# Patient Record
Sex: MALE
[De-identification: names, ages, dates, MRNs, and addresses within clinical notes are randomized per-mention and may not be internally consistent; named-entity substitution may affect disease eponyms.]

---

## 2019-08-03 ENCOUNTER — HOSPITAL ENCOUNTER (EMERGENCY)
Dept: HOSPITAL 97 - ER | Age: 29
Discharge: HOME | End: 2019-08-03
Payer: COMMERCIAL

## 2019-08-03 DIAGNOSIS — E86.0: Primary | ICD-10-CM

## 2019-08-03 LAB
ALBUMIN SERPL BCP-MCNC: 3.8 G/DL (ref 3.4–5)
ALP SERPL-CCNC: 69 U/L (ref 45–117)
ALT SERPL W P-5'-P-CCNC: 54 U/L (ref 12–78)
AST SERPL W P-5'-P-CCNC: 34 U/L (ref 15–37)
BUN BLD-MCNC: 21 MG/DL (ref 7–18)
CKMB CREATINE KINASE MB: 1.9 NG/ML (ref 0.3–3.6)
GLUCOSE SERPLBLD-MCNC: 112 MG/DL (ref 74–106)
HCT VFR BLD CALC: 49.5 % (ref 39.6–49)
INR BLD: 0.97
LYMPHOCYTES # SPEC AUTO: 2.1 K/UL (ref 0.7–4.9)
MAGNESIUM SERPL-MCNC: 2.2 MG/DL (ref 1.8–2.4)
PMV BLD: 8.7 FL (ref 7.6–11.3)
POTASSIUM SERPL-SCNC: 4.3 MMOL/L (ref 3.5–5.1)
RBC # BLD: 5.47 M/UL (ref 4.33–5.43)
TROPONIN (EMERG DEPT USE ONLY): < 0.02 NG/ML (ref 0–0.04)

## 2019-08-03 PROCEDURE — 82553 CREATINE MB FRACTION: CPT

## 2019-08-03 PROCEDURE — 85610 PROTHROMBIN TIME: CPT

## 2019-08-03 PROCEDURE — 84484 ASSAY OF TROPONIN QUANT: CPT

## 2019-08-03 PROCEDURE — 36415 COLL VENOUS BLD VENIPUNCTURE: CPT

## 2019-08-03 PROCEDURE — 96360 HYDRATION IV INFUSION INIT: CPT

## 2019-08-03 PROCEDURE — 82550 ASSAY OF CK (CPK): CPT

## 2019-08-03 PROCEDURE — 80048 BASIC METABOLIC PNL TOTAL CA: CPT

## 2019-08-03 PROCEDURE — 99284 EMERGENCY DEPT VISIT MOD MDM: CPT

## 2019-08-03 PROCEDURE — 83735 ASSAY OF MAGNESIUM: CPT

## 2019-08-03 PROCEDURE — 85025 COMPLETE CBC W/AUTO DIFF WBC: CPT

## 2019-08-03 PROCEDURE — 80076 HEPATIC FUNCTION PANEL: CPT

## 2019-08-03 PROCEDURE — 93005 ELECTROCARDIOGRAM TRACING: CPT

## 2019-08-03 NOTE — ER
Nurse's Notes                                                                                     

 [facility]                                                                 

Name: Josafat Gutierres                                                                           

Age: 28 yrs                                                                                       

Sex: Male                                                                                         

: 1990                                                                                   

MRN: [id number]                                                                                   

Arrival Date: 2019                                                                          

Time: 11:01                                                                                       

Account#: [id number]                                                                            

Bed 20                                                                                            

Private MD:                                                                                       

Diagnosis: Dizziness and giddiness;Dehydration                                                    

                                                                                                  

Presentation:                                                                                     

                                                                                             

11:01 Presenting complaint: Patient states: i started feeling dizzy Wednesday while at work,  hj  

      it comes and goes but today its constant; reports N/V;. Transition of care: patient was     

      not received from another setting of care. Onset of symptoms was 2019. Risk      

      Assessment: Do you want to hurt yourself or someone else? Patient reports no desire to      

      harm self or others. Initial Sepsis Screen: Does the patient meet any 2 criteria? No.       

      Patient's initial sepsis screen is negative. Does the patient have a suspected source       

      of infection? No. Patient's initial sepsis screen is negative. Care prior to arrival:       

      None.                                                                                       

11:01 Method Of Arrival: Ambulatory                                                             

11:01 Acuity: FLOR 3                                                                           hj  

                                                                                                  

Historical:                                                                                       

- Allergies:                                                                                      

11:03 No Known Allergies;                                                                     hj  

- PMHx:                                                                                           

11:03 None;                                                                                   hj  

- PSHx:                                                                                           

11:03 Knee surgery;                                                                           hj  

                                                                                                  

                                                                                                  

                                                                                                  

Screenin:15 Abuse screen: Denies threats or abuse. Nutritional screening: No deficits noted.        em  

      Tuberculosis screening: No symptoms or risk factors identified. Fall Risk None              

      identified.                                                                                 

                                                                                                  

Assessment:                                                                                       

11:15 General: Appears in no apparent distress. comfortable, Behavior is calm, cooperative,   em  

      Denies fever. Pain: Denies pain. Neuro: Level of Consciousness is awake, alert, obeys       

      commands, Oriented to person, place, time, situation, Reports dizziness that comes and      

      goes, denies being dizzy currently . Denies weakness dizziness, headache.                   

      Cardiovascular: Denies chest pain, Capillary refill < 3 seconds Patient's skin is warm      

      and dry. Rhythm is sinus rhythm. Respiratory: Airway is patent Respiratory effort is        

      even, unlabored, Respiratory pattern is regular, symmetrical. GI: Patient currently         

      denies nausea, vomiting. Derm: Skin is intact, is healthy with good turgor, Skin is         

      pink, warm \T\ dry. Musculoskeletal: Capillary refill < 3 seconds, Range of motion:         

      intact in all extremities.                                                                  

12:30 Reassessment: Patient appears in no apparent distress at this time. Patient and/or      em  

      family updated on plan of care and expected duration. Pain level reassessed. Patient is     

      alert, oriented x 3, equal unlabored respirations, skin warm/dry/pink.                      

                                                                                                  

Vital Signs:                                                                                      

11:03  / 70; Pulse 72; Resp 18; Temp 97.9(TE); Pulse Ox 100% on R/A; Weight 127.01 kg;  hj  

      Height 5 ft. 9 in. (175.26 cm); Pain 0/10;                                                  

11:38  / 87 Standing; Pulse 77;                                                         em  

11:38  / 83 Sitting; Pulse 69;                                                          em  

11:38  / 79 Supine; Pulse 67;                                                           em  

12:36  / 81; Pulse 66; Resp 18; Pulse Ox 100% on R/A;                                   em  

11:03 Body Mass Index 41.35 (127.01 kg, 175.26 cm)                                            hj  

11:38 denies dizzines                                                                         em  

11:38 denies dizziness                                                                        em  

11:38 denies dizziness                                                                        em  

                                                                                                  

ED Course:                                                                                        

11:01 Patient arrived in ED.                                                                  mr  

11:03 Triage completed.                                                                       hj  

11:03 Arm band placed on right wrist.                                                         hj  

11:15 Patient has correct armband on for positive identification. Placed in gown. Bed in low  em  

      position. Call light in reach.                                                              

11:16 Mcbride, Rohith, LVN is Primary Nurse.                                                     em  

11:16 Blake Jhaveri PA is PHCP.                                                                cp  

11:17 Michael Cole MD is Attending Physician.                                                cp  

11:50 Initial lab(s) drawn, by me, sent to lab. Inserted saline lock: 20 gauge in right       em  

      antecubital area, using aseptic technique. Blood collected.                                 

13:08 No provider procedures requiring assistance completed. IV discontinued, intact,         em  

      bleeding controlled, No redness/swelling at site. Pressure dressing applied.                

                                                                                                  

Administered Medications:                                                                         

12:01 Drug: NS 0.9% 1000 ml Route: IV; Rate: 1 bolus; Site: right antecubital;                em  

13:14 Follow up: IV Status: Completed infusion; IV Intake: 1000ml                             em  

                                                                                                  

                                                                                                  

Intake:                                                                                           

13:14 IV: 1000ml; Total: 1000ml.                                                              em  

                                                                                                  

Outcome:                                                                                          

12:56 Discharge ordered by MD.                                                                cp  

13:09 Discharged to home ambulatory.                                                          em  

13:09 Condition: good                                                                             

13:09 Discharge instructions given to patient, Instructed on discharge instructions, follow       

      up and referral plans. Demonstrated understanding of instructions, follow-up care.          

13:09 Patient left the ED.                                                                    em  

                                                                                                  

Signatures:                                                                                       

Zoran                                  mr                                                   

Reed, Rohith, LVN                       LVN  em                                                   

Sameer Silverio, IRINEO                      RN   [place] PA PA   cp                                                   

                                                                                                  

Corrections: (The following items were deleted from the chart)                                    

11:05 11:03 Pulse 72bpm; Resp 18bpm; Pulse Ox 100% RA; Temp 97.9F Temporal; 127.01 kg; Height hj  

      5 ft. 9 in.; BMI: 41.3; Pain 0/10; hj                                                       

                                                                                                  

**************************************************************************************************

## 2019-08-03 NOTE — EDPHYS
Physician Documentation                                                                           

 [facility]                                                                 

Name: Josafat Gutierres                                                                           

Age: 28 yrs                                                                                       

Sex: Male                                                                                         

: 1990                                                                                   

MRN: [id number]                                                                                   

Arrival Date: 2019                                                                          

Time: 11:01                                                                                       

Account#: [id number]                                                                            

Bed 20                                                                                            

Private MD:                                                                                       

ED Physician Michael Cole                                                                         

HPI:                                                                                              

                                                                                             

11:35 This 28 yrs old  Male presents to ER via Ambulatory with complaints of          cp  

      Dizziness.                                                                                  

11:35 The patient presents with dizziness, feeling faint, lightheadedness.                    cp  

11:35 Onset: The symptoms/episode began/occurred 3 day(s) ago, intermittent.                  cp  

11:35 Context: occurred at work, occurred while the patient was working, just prior to the    cp  

      episode the patient experienced no apparent symptoms. Associated signs and symptoms:        

      Pertinent negatives: abdominal pain, blurred vision, chest pain, diaphoresis, focal         

      weakness, shortness of breath, syncope. Severity of symptoms: in the emergency              

      department the symptoms have improved moderately. Patient's baseline: Neuro: alert and      

      fully oriented, Motor: no deficits, Ambulation: walks without assistance, Speech:           

      normal.                                                                                     

                                                                                                  

Historical:                                                                                       

- Allergies:                                                                                      

11:03 No Known Allergies;                                                                     hj  

- PMHx:                                                                                           

11:03 None;                                                                                   hj  

- PSHx:                                                                                           

11:03 Knee surgery;                                                                           hj  

                                                                                                  

                                                                                                  

                                                                                                  

ROS:                                                                                              

11:40 Constitutional: Negative for body aches, chills, fever, poor PO intake.                 cp  

11:40 Eyes: Negative for injury, pain, redness, and discharge.                                cp  

11:40 ENT: Negative for drainage from ear(s), ear pain, sore throat, difficulty swallowing,       

      difficulty handling secretions.                                                             

11:40 Cardiovascular: Negative for chest pain, edema, palpitations.                               

11:40 Respiratory: Negative for cough, shortness of breath, wheezing.                             

11:40 Abdomen/GI: Negative for abdominal pain, nausea, vomiting, and diarrhea, constipation,      

      black/tarry stool, rectal bleeding.                                                         

11:40 : Negative for urinary symptoms.                                                          

11:40 Skin: Negative for rash.                                                                    

11:40 Neuro: Positive for dizziness, Negative for altered mental status, headache, syncope,       

      weakness.                                                                                   

11:40 All other systems are negative.                                                             

                                                                                                  

Exam:                                                                                             

11:45 Constitutional: The patient appears in no acute distress, alert, awake,                 cp  

      non-diaphoretic, well developed, well nourished, obese.                                     

11:45 Head/Face:  Normocephalic, atraumatic.                                                  cp  

11:45 Eyes: Periorbital structures: appear normal, Conjunctiva: normal, no exudate, no            

      injection, Lids and lashes: appear normal, bilaterally.                                     

11:45 ENT: External ear(s): are unremarkable, Ear canal(s): are normal, clear, TM's: bulging,     

      is not appreciated, bilaterally, dullness, bilaterally, erythema, is not appreciated,       

      bilaterally, Nose: is normal, Mouth: Lips: moist, Oral mucosa: pink and intact, moist,      

      Posterior pharynx: is normal, airway is patent, no erythema, no exudate.                    

11:45 Chest/axilla: Inspection: normal, Palpation: is normal, no crepitus, no tenderness.         

11:45 Cardiovascular: Rate: normal, Rhythm: regular.                                              

11:45 Respiratory: the patient does not display signs of respiratory distress,  Respirations:     

      normal, no use of accessory muscles, no retractions, no splinting, no tachypnea, no         

      acute changes, is not noted, Breath sounds: are clear throughout, no decreased breath       

      sounds, no stridor, no wheezing.                                                            

11:45 Abdomen/GI: Inspection: abdomen appears normal, Palpation: abdomen is soft and              

      non-tender, in all quadrants.                                                               

11:45 Back: pain, is absent, ROM is normal.                                                       

11:45 Skin: no rash present.                                                                      

11:45 Neuro: Orientation: to person, place \T\ time. Mentation: is normal, Cerebellar function:   

      is grossly normal, Motor: moves all fours, strength is normal, Sensation: is normal,        

      Gait: is steady, at a normal pace, without difficulty.                                      

12:15 ECG was reviewed by the Attending Physician.                                            cp  

                                                                                                  

Vital Signs:                                                                                      

11:03  / 70; Pulse 72; Resp 18; Temp 97.9(TE); Pulse Ox 100% on R/A; Weight 127.01 kg;  hj  

      Height 5 ft. 9 in. (175.26 cm); Pain 0/10;                                                  

11:38  / 87 Standing; Pulse 77;                                                         em  

11:38  / 83 Sitting; Pulse 69;                                                          em  

11:38  / 79 Supine; Pulse 67;                                                           em  

12:36  / 81; Pulse 66; Resp 18; Pulse Ox 100% on R/A;                                   em  

11:03 Body Mass Index 41.35 (127.01 kg, 175.26 cm)                                            hj  

11:38 denies dizzines                                                                         em  

11:38 denies dizziness                                                                        em  

11:38 denies dizziness                                                                        em  

                                                                                                  

MDM:                                                                                              

11:24 Patient medically screened.                                                             cp  

12:55 Data reviewed: vital signs, nurses notes, lab test result(s), EKG, and as a result, I   cp  

      will discharge patient.                                                                     

12:55 Differential diagnosis: cardiac arrhythmia, GI bleed, hypovolemia, idiopathic           cp  

      dizziness, TIA. Counseling: I had a detailed discussion with the patient and/or             

      guardian regarding: the historical points, exam findings, and any diagnostic results        

      supporting the discharge/admit diagnosis, lab results, the need for outpatient follow       

      up, a family practitioner. Response to treatment: the patient's symptoms have markedly      

      improved after treatment.                                                                   

                                                                                                  

                                                                                             

11:37 Order name: Basic Metabolic Panel; Complete Time: 12:25                                 cp  

/                                                                                             

12:25 Interpretation: Normal except: ; GLUC 112; BUN 21.                                cp  

                                                                                             

11:37 Order name: CBC with Diff; Complete Time: 12:14                                         cp  

                                                                                             

12:26 Interpretation: Normal except: RBC 5.47; HCT 49.5; EOSINOPHIL % 7.6; EOSA 0.6.          cp  

                                                                                             

11:37 Order name: LFT's; Complete Time: 12:25                                                 cp  

                                                                                             

11:37 Order name: Magnesium; Complete Time: 12:25                                             cp  

                                                                                             

11:37 Order name: PT-INR; Complete Time: 12:14                                                cp  

                                                                                             

11:37 Order name: Troponin (emerg Dept Use Only); Complete Time: 12:25                        cp  

                                                                                             

11:19 Order name: Orthostatics; Complete Time: 11:32                                          cp  

                                                                                             

11:37 Order name: EKG; Complete Time: 11:38                                                   cp  

                                                                                             

11:37 Order name: EKG - Nurse/Tech; Complete Time: 12:30                                      cp  

                                                                                             

11:37 Order name: Cardiac monitoring; Complete Time: 11:58                                    cp  

                                                                                             

11:37 Order name: IV Saline Lock; Complete Time: 11:58                                        cp  

                                                                                             

11:37 Order name: CK; Complete Time: 12:25                                                    cp  

                                                                                             

12:26 Interpretation: Abnormal: .                                                      cp  

                                                                                             

11:37 Order name: Ckmb; Complete Time: 12:25                                                  cp  

                                                                                             

11:37 Order name: Labs collected and sent; Complete Time: 11:58                               cp  

                                                                                             

11:37 Order name: O2 Per Protocol; Complete Time: 11:58                                       cp  

                                                                                             

11:37 Order name: O2 Sat Monitoring; Complete Time: :58                                     cp  

                                                                                                  

EC:15 Rate is 60 beats/min. Rhythm is regular. OK interval is normal. QRS interval is normal. cp  

      QT interval is normal. Interpreted by me. Reviewed by me.                                   

                                                                                                  

Administered Medications:                                                                         

12:01 Drug: NS 0.9% 1000 ml Route: IV; Rate: 1 bolus; Site: right antecubital;                em  

13:14 Follow up: IV Status: Completed infusion; IV Intake: 1000ml                             em  

                                                                                                  

                                                                                                  

Disposition:                                                                                      

15:27 Co-signature as Attending Physician, Michael Cole MD.                                    rn  

                                                                                                  

Disposition:                                                                                      

19 12:56 Discharged to Home. Impression: Dizziness and giddiness, Dehydration.              

- Condition is Stable.                                                                            

- Discharge Instructions: Dehydration, Adult, Dizziness.                                          

                                                                                                  

- Medication Reconciliation Form, Thank You Letter, Antibiotic Education, Prescription            

  Opioid Use form.                                                                                

- Follow up: Private Physician; When: 2 - 3 days; Reason: Worsening of condition.                 

- Problem is new.                                                                                 

- Symptoms have improved.                                                                         

                                                                                                  

                                                                                                  

                                                                                                  

Signatures:                                                                                       

Dispatcher [employer]                           EDMS                                                 

Rohith Mcbride, LVN                       LVN                                                     

Michael Cole MD MD rn Joaquin, Henry, RN RN hj Page, Corey, PA PA   cp                                                   

                                                                                                  

Corrections: (The following items were deleted from the chart)                                    

12:26 12:15 Normal except: RBC 5.47; HCT 49.5; EOSINOPHIL % 7.6. cp                           cp  

12:56 12:56 2019 12:56 Discharged to Home. Impression: Dizziness and giddiness.         cp  

      Condition is Stable. Forms are Medication Reconciliation Form, Thank You Letter,            

      Antibiotic Education, Prescription Opioid Use. Follow up: Private Physician; When: 2 -      

      3 days; Reason: Worsening of condition. Problem is new. Symptoms have improved. cp          

13:09 12:56 2019 12:56 Discharged to Home. Impression: Dizziness and giddiness;         em  

      Dehydration. Condition is Stable. Discharge Instructions: Dehydration, Adult,               

      Dizziness. Forms are Medication Reconciliation Form, Thank You Letter, Antibiotic           

      Education, Prescription Opioid Use. Follow up: Private Physician; When: 2 - 3 days;         

      Reason: Worsening of condition. Problem is new. Symptoms have improved. cp                  

                                                                                                  

**************************************************************************************************

## 2019-08-05 NOTE — EKG
Test Date:    2019-08-03               Test Time:    12:06:40

Technician:                                       

                                                     

MEASUREMENT RESULTS:                                       

Intervals:                                           

Rate:         60                                     

PA:           118                                    

QRSD:         96                                     

QT:           372                                    

QTc:          372                                    

Axis:                                                

P:            52                                     

PA:           118                                    

QRS:          6                                      

T:            18                                     

                                                     

INTERPRETIVE STATEMENTS:                                       

                                                     

Normal sinus rhythm with sinus arrhythmia

Normal ECG

No previous ECG available for comparison



Electronically Signed On 08-05-19 07:45:32 CDT by Connor Singh